# Patient Record
(demographics unavailable — no encounter records)

---

## 2020-04-09 NOTE — REP
REASON: Pulmonary nodule workup.

 

There are no priors for comparison.

 

Calcified mediastinal and right hilar lymph nodes are present. There is no

adenopathy.  There are no pleural or pericardial effusions.  The imaged upper

abdomen and imaged osseous structures are within normal limits.

 

Evaluation of the lung fields shows an 8 mm sized calcification in the right

upper lobe consistent with a calcified granuloma.  There is a smaller central

calcifying 7 mm sized nodule in the left lower lobe.  In the left lower lobe more

superiorly, there is a pleural-based subtle somewhat patchy/ground-glass opacity

with ill-defined margins measuring 1.6 cm in its greatest dimension.

 

IMPRESSION:

 

1.  Pulmonary calcifications, as described above, consistent with granulomas.

 

2.  Subtle zone of parenchymal density which is pleural-based and seen in the

left lower lobe as described above.  This is most consistent with a small zone of

fibrosis or lung parenchymal scar, however, I would recommend a 3-month followup

to insure stability.  The followup should be a chest CT.  There is on revised

Fleischner [employer] criteria and the recommendation for followup of such findings.

 

 

 

Electronically Signed by

Lj Lee DO 04/09/2020 11:10 A